# Patient Record
Sex: MALE | Race: WHITE | NOT HISPANIC OR LATINO | Employment: FULL TIME | URBAN - METROPOLITAN AREA
[De-identification: names, ages, dates, MRNs, and addresses within clinical notes are randomized per-mention and may not be internally consistent; named-entity substitution may affect disease eponyms.]

---

## 2018-07-23 ENCOUNTER — OFFICE VISIT CONVERTED (OUTPATIENT)
Dept: CARDIOLOGY | Facility: CLINIC | Age: 38
End: 2018-07-23
Attending: INTERNAL MEDICINE

## 2018-07-25 ENCOUNTER — CONVERSION ENCOUNTER (OUTPATIENT)
Dept: CARDIOLOGY | Facility: CLINIC | Age: 38
End: 2018-07-25

## 2018-07-25 ENCOUNTER — CONVERSION ENCOUNTER (OUTPATIENT)
Dept: CARDIOLOGY | Facility: CLINIC | Age: 38
End: 2018-07-25
Attending: INTERNAL MEDICINE

## 2019-06-23 ENCOUNTER — HOSPITAL ENCOUNTER (OUTPATIENT)
Dept: URGENT CARE | Facility: CLINIC | Age: 39
Discharge: HOME | End: 2019-06-23
Attending: NURSE PRACTITIONER

## 2021-05-16 VITALS
BODY MASS INDEX: 35.12 KG/M2 | HEIGHT: 73 IN | DIASTOLIC BLOOD PRESSURE: 82 MMHG | WEIGHT: 265 LBS | SYSTOLIC BLOOD PRESSURE: 143 MMHG | HEART RATE: 82 BPM

## 2021-05-16 VITALS
HEART RATE: 98 BPM | HEIGHT: 73 IN | SYSTOLIC BLOOD PRESSURE: 150 MMHG | WEIGHT: 268 LBS | DIASTOLIC BLOOD PRESSURE: 98 MMHG | BODY MASS INDEX: 35.52 KG/M2

## 2022-09-26 ENCOUNTER — TRANSCRIBE ORDERS (OUTPATIENT)
Dept: ADMINISTRATIVE | Facility: HOSPITAL | Age: 42
End: 2022-09-26

## 2022-09-26 DIAGNOSIS — R07.9 CHEST PAIN, UNSPECIFIED TYPE: Primary | ICD-10-CM

## 2022-09-26 DIAGNOSIS — Z86.79 HISTORY OF ANGINA: ICD-10-CM

## 2022-09-29 ENCOUNTER — HOSPITAL ENCOUNTER (OUTPATIENT)
Dept: CARDIOLOGY | Facility: HOSPITAL | Age: 42
Discharge: HOME | End: 2022-09-29
Admitting: FAMILY MEDICINE

## 2022-09-29 VITALS — BODY MASS INDEX: 36.84 KG/M2 | HEIGHT: 73 IN | WEIGHT: 278 LBS

## 2022-09-29 DIAGNOSIS — R07.9 CHEST PAIN, UNSPECIFIED TYPE: ICD-10-CM

## 2022-09-29 DIAGNOSIS — Z86.79 HISTORY OF ANGINA: ICD-10-CM

## 2022-09-29 LAB
BH CV IMMEDIATE POST RECOVERY TECH DATA SYMPTOMS: NORMAL
BH CV IMMEDIATE POST TECH DATA BLOOD PRESSURE: NORMAL MMHG
BH CV IMMEDIATE POST TECH DATA HEART RATE: 140 BPM
BH CV NINE MINUTE RECOVERY TECH DATA BLOOD PRESSURE: NORMAL MMHG
BH CV NINE MINUTE RECOVERY TECH DATA HEART RATE: 99 BPM
BH CV NINE MINUTE RECOVERY TECH DATA SYMPTOMS: NORMAL
BH CV SIX MINUTE RECOVERY TECH DATA BLOOD PRESSURE: NORMAL
BH CV SIX MINUTE RECOVERY TECH DATA HEART RATE: 103 BPM
BH CV SIX MINUTE RECOVERY TECH DATA SYMPTOMS: NORMAL
BH CV STRESS BP STAGE 1: NORMAL
BH CV STRESS BP STAGE 2: NORMAL
BH CV STRESS BP STAGE 3: NORMAL
BH CV STRESS DURATION MIN STAGE 1: 3
BH CV STRESS DURATION MIN STAGE 2: 3
BH CV STRESS DURATION MIN STAGE 3: 3
BH CV STRESS DURATION SEC STAGE 1: 0
BH CV STRESS DURATION SEC STAGE 2: 0
BH CV STRESS DURATION SEC STAGE 3: 0
BH CV STRESS GRADE STAGE 1: 10
BH CV STRESS GRADE STAGE 2: 12
BH CV STRESS GRADE STAGE 3: 14
BH CV STRESS HR STAGE 1: 124
BH CV STRESS HR STAGE 2: 136
BH CV STRESS HR STAGE 3: 156
BH CV STRESS METS STAGE 1: 5
BH CV STRESS METS STAGE 2: 7.5
BH CV STRESS METS STAGE 3: 10
BH CV STRESS O2 STAGE 2: 97
BH CV STRESS O2 STAGE 3: 96
BH CV STRESS PROTOCOL 1: NORMAL
BH CV STRESS RECOVERY BP: NORMAL MMHG
BH CV STRESS RECOVERY HR: 102 BPM
BH CV STRESS SPEED STAGE 1: 1.7
BH CV STRESS SPEED STAGE 2: 2.5
BH CV STRESS SPEED STAGE 3: 3.4
BH CV STRESS STAGE 1: 1
BH CV STRESS STAGE 2: 2
BH CV STRESS STAGE 3: 3
BH CV THREE MINUTE POST TECH DATA BLOOD PRESSURE: NORMAL MMHG
BH CV THREE MINUTE POST TECH DATA HEART RATE: 109 BPM
BH CV THREE MINUTE RECOVERY TECH DATA SYMPTOM: NORMAL
BH CV TWELVE MINUTE RECOVERY TECH DATA BLOOD PRESSURE: NORMAL MMHG
BH CV TWELVE MINUTE RECOVERY TECH DATA HEART RATE: 102 BPM
BH CV TWELVE MINUTE RECOVERY TECH DATA SYMPTOMS: NORMAL
MAXIMAL PREDICTED HEART RATE: 180 BPM
PERCENT MAX PREDICTED HR: 86.67 %
STRESS BASELINE BP: NORMAL MMHG
STRESS BASELINE HR: 97 BPM
STRESS PERCENT HR: 102 %
STRESS POST ESTIMATED WORKLOAD: 10.2 METS
STRESS POST EXERCISE DUR MIN: 9 MIN
STRESS POST EXERCISE DUR SEC: 0 SEC
STRESS POST O2 SAT PEAK: 97 %
STRESS POST PEAK BP: NORMAL MMHG
STRESS POST PEAK HR: 156 BPM
STRESS TARGET HR: 153 BPM

## 2022-09-29 PROCEDURE — 93018 CV STRESS TEST I&R ONLY: CPT | Performed by: INTERNAL MEDICINE

## 2022-09-29 PROCEDURE — 93017 CV STRESS TEST TRACING ONLY: CPT

## 2022-09-29 RX ORDER — METOPROLOL SUCCINATE 50 MG/1
50 TABLET, EXTENDED RELEASE ORAL DAILY
COMMUNITY
End: 2022-11-04

## 2022-09-29 RX ORDER — LISINOPRIL AND HYDROCHLOROTHIAZIDE 25; 20 MG/1; MG/1
1 TABLET ORAL DAILY
COMMUNITY
End: 2022-11-04

## 2022-09-29 NOTE — DISCHARGE INSTRUCTIONS
Ordering physicians/PCP will contact you with results.  Follow up with PCP/ordering physician as scheduled or as needed.  If chest pain gets worse, lasts longer, and/or doesn't go away by itself we recommend calling 911 or going to the nearest emergency room.  Resume activity as tolerated.  Continue prescribed medications as ordered.

## 2022-11-03 ENCOUNTER — OFFICE VISIT (OUTPATIENT)
Dept: CARDIOLOGY | Facility: CLINIC | Age: 42
End: 2022-11-03

## 2022-11-03 VITALS
HEART RATE: 69 BPM | WEIGHT: 290.4 LBS | DIASTOLIC BLOOD PRESSURE: 112 MMHG | SYSTOLIC BLOOD PRESSURE: 150 MMHG | BODY MASS INDEX: 38.49 KG/M2 | HEIGHT: 73 IN

## 2022-11-03 DIAGNOSIS — R07.89 CHEST PAIN, ATYPICAL: Primary | ICD-10-CM

## 2022-11-03 DIAGNOSIS — G47.10 HYPERSOMNIA: ICD-10-CM

## 2022-11-03 DIAGNOSIS — I10 ESSENTIAL HYPERTENSION: ICD-10-CM

## 2022-11-03 PROCEDURE — 99204 OFFICE O/P NEW MOD 45 MIN: CPT | Performed by: INTERNAL MEDICINE

## 2022-11-03 RX ORDER — NITROGLYCERIN 0.4 MG/1
TABLET SUBLINGUAL AS NEEDED
COMMUNITY
Start: 2022-09-26 | End: 2023-02-13

## 2022-11-03 NOTE — PROGRESS NOTES
[facility]      Patient Name: Mani Edgar  :1980  Age: 41 y.o.  Encounter Provider:  Leonardo Garcia Jr, MD      Chief Complaint: Initial evaluation of chest discomfort and uncontrolled hypertension      HPI  Mani Edgar is a 41 y.o. male past medical history of normal cardiac catheterization in 2018 who recently started having chest discomfort and was evaluated at [facility].  He had very typical chest pain radiating to the left upper extremity 2018 was taken for cardiac catheterization.  No intervention performed but I do not have the report for evaluation at this time.  Records have been requested.  He was noted to have hypertension at the time and was started on medications which has been pretty well controlled until the end of September.  He woke up on  very dizzy with a blood pressure of 215/135 mmHg.  Benign work-up in the ER but he followed up at the local cardiology office.  A stress study was performed showing nondiagnostic ST changes but severe hypertensive response to exercise.  His lisinopril hydrochlorothiazide combination pill was doubled but he still having very high blood pressure.  He did not bring a home log in for evaluation at time of interview.  He denies exertional dyspnea, orthopnea, PND or edema.  No palpitations, dizziness or syncope.  He admits to snoring but has never been evaluated for sleep apnea.  No history of thyroid disease.  No family history of coronary artery disease.  He is a lifelong non-smoker drinks rarely and denies illicit drug use.      The following portions of the patient's history were reviewed and updated as appropriate: allergies, current medications, past family history, past medical history, past social history, past surgical history and problem list.      Review of Systems   Constitutional: Negative for chills and fever.   HENT: Negative for hoarse voice and sore throat.    Eyes: Negative for double vision and  "photophobia.   Cardiovascular: Positive for chest pain. Negative for leg swelling, near-syncope, orthopnea, palpitations, paroxysmal nocturnal dyspnea and syncope.   Respiratory: Negative for cough and wheezing.    Skin: Negative for poor wound healing and rash.   Musculoskeletal: Negative for arthritis and joint swelling.   Gastrointestinal: Negative for bloating, abdominal pain, hematemesis and hematochezia.   Neurological: Positive for dizziness. Negative for focal weakness.   Psychiatric/Behavioral: Negative for depression and suicidal ideas.       OBJECTIVE:   Vital Signs  Vitals:    11/03/22 0949   BP: (!) 150/112   Pulse: 69     Estimated body mass index is 38.31 kg/m² as calculated from the following:    Height as of this encounter: 185.4 cm (73\").    Weight as of this encounter: 132 kg (290 lb 6.4 oz).    Vitals reviewed.   Constitutional:       Appearance: Healthy appearance. Not in distress.   Neck:      Vascular: No JVR. JVD normal.   Pulmonary:      Effort: Pulmonary effort is normal.      Breath sounds: Normal breath sounds. No wheezing. No rhonchi. No rales.   Chest:      Chest wall: Not tender to palpatation.   Cardiovascular:      PMI at left midclavicular line. Normal rate. Regular rhythm. Normal S1. Normal S2.      Murmurs: There is no murmur.      No gallop. No click. No rub.   Pulses:     Intact distal pulses.   Edema:     Peripheral edema absent.   Abdominal:      General: Bowel sounds are normal.      Palpations: Abdomen is soft.      Tenderness: There is no abdominal tenderness.   Musculoskeletal: Normal range of motion.         General: No tenderness. Skin:     General: Skin is warm and dry.   Neurological:      General: No focal deficit present.      Mental Status: Alert and oriented to person, place and time.           ECG 12 Lead    Date/Time: 11/4/2022 1:12 PM  Performed by: Leonardo Garcia Jr., MD  Authorized by: Leonardo Garcia Jr., MD   Comparison: not compared with previous ECG "   Previous ECG: no previous ECG available  Rhythm: sinus rhythm  Other findings: left atrial abnormality    Clinical impression: non-specific ECG                  ASSESSMENT:     41-year-old male presents for evaluation management of uncontrolled hypertension    PLAN OF CARE:     1. Uncontrolled hypertension -poorly controlled and needs aggressive afterload reduction.  We will stop metoprolol and start carvedilol hoping that alpha antagonism will improve blood pressure control.  I will increase the lisinopril portion of his combination pill and continue the current hydrochlorothiazide dosing so that he will have a 40/25 mg pill daily.  He will send me a twice daily blood pressure log after 2 weeks.  We will reevaluate him in clinic in 1 month.  At that point we will decide whether or not he it is appropriate for him to return to work but with his current level of blood pressure control I cannot recommend that he go back to work.  Given his hypersomnia and snoring we will plan for home sleep study.  He will continue to work on slowly increasing exercise and decreasing caloric intake.  2. Hypersomnia -Home sleep study  3. Abnormal stress test -secondary to hypertensive response.  Normal cath 5 years ago.  Low suspicion for ischemic etiology.    Return to clinic 4 weeks             Discharge Medications          Accurate as of November 3, 2022  9:54 AM. If you have any questions, ask your nurse or doctor.            Continue These Medications      Instructions Start Date   lisinopril-hydrochlorothiazide 20-25 MG per tablet  Commonly known as: PRINZIDE,ZESTORETIC   1 tablet, Oral, Daily      metoprolol succinate XL 50 MG 24 hr tablet  Commonly known as: TOPROL-XL   50 mg, Oral, Daily      nitroglycerin 0.4 MG SL tablet  Commonly known as: NITROSTAT   As Needed             Thank you for allowing me to participate in the care of your patient,      Sincerely,   Leonardo Garcia MD  [facility]  11/03/22  09:54 EDT

## 2022-11-04 PROCEDURE — 93000 ELECTROCARDIOGRAM COMPLETE: CPT | Performed by: INTERNAL MEDICINE

## 2022-11-04 RX ORDER — CARVEDILOL 12.5 MG/1
12.5 TABLET ORAL 2 TIMES DAILY
Qty: 60 TABLET | Refills: 11 | Status: SHIPPED | OUTPATIENT
Start: 2022-11-04 | End: 2023-01-11

## 2022-11-04 RX ORDER — LISINOPRIL 40 MG/1
40 TABLET ORAL DAILY
Qty: 30 TABLET | Refills: 11 | Status: SHIPPED | OUTPATIENT
Start: 2022-11-04

## 2022-11-04 RX ORDER — HYDROCHLOROTHIAZIDE 25 MG/1
25 TABLET ORAL DAILY
Qty: 30 TABLET | Refills: 11 | Status: SHIPPED | OUTPATIENT
Start: 2022-11-04 | End: 2023-01-11 | Stop reason: ALTCHOICE

## 2022-11-11 ENCOUNTER — HOSPITAL ENCOUNTER (OUTPATIENT)
Dept: SLEEP MEDICINE | Facility: HOSPITAL | Age: 42
Discharge: HOME | End: 2022-11-11
Admitting: INTERNAL MEDICINE

## 2022-11-11 DIAGNOSIS — G47.10 HYPERSOMNIA: ICD-10-CM

## 2022-11-11 PROCEDURE — 95806 SLEEP STUDY UNATT&RESP EFFT: CPT

## 2022-11-11 PROCEDURE — 95806 SLEEP STUDY UNATT&RESP EFFT: CPT | Performed by: INTERNAL MEDICINE

## 2022-11-17 DIAGNOSIS — R06.83 SNORING: ICD-10-CM

## 2022-11-17 DIAGNOSIS — G47.33 OSA (OBSTRUCTIVE SLEEP APNEA): Primary | ICD-10-CM

## 2022-12-02 ENCOUNTER — TELEPHONE (OUTPATIENT)
Dept: CARDIOLOGY | Facility: CLINIC | Age: 42
End: 2022-12-02

## 2022-12-02 ENCOUNTER — TELEPHONE (OUTPATIENT)
Dept: SLEEP MEDICINE | Facility: HOSPITAL | Age: 42
End: 2022-12-02

## 2022-12-02 ENCOUNTER — LAB (OUTPATIENT)
Dept: LAB | Facility: HOSPITAL | Age: 42
End: 2022-12-02

## 2022-12-02 ENCOUNTER — OFFICE VISIT (OUTPATIENT)
Dept: CARDIOLOGY | Facility: CLINIC | Age: 42
End: 2022-12-02

## 2022-12-02 VITALS
HEIGHT: 73 IN | BODY MASS INDEX: 38.62 KG/M2 | HEART RATE: 73 BPM | WEIGHT: 291.4 LBS | OXYGEN SATURATION: 98 % | SYSTOLIC BLOOD PRESSURE: 148 MMHG | DIASTOLIC BLOOD PRESSURE: 100 MMHG

## 2022-12-02 DIAGNOSIS — I10 ESSENTIAL HYPERTENSION: Primary | ICD-10-CM

## 2022-12-02 DIAGNOSIS — I10 ESSENTIAL HYPERTENSION: ICD-10-CM

## 2022-12-02 LAB
ANION GAP SERPL CALCULATED.3IONS-SCNC: 8.8 MMOL/L (ref 5–15)
BUN SERPL-MCNC: 11 MG/DL (ref 6–20)
BUN/CREAT SERPL: 9.7 (ref 7–25)
CALCIUM SPEC-SCNC: 9.5 MG/DL (ref 8.6–10.5)
CHLORIDE SERPL-SCNC: 103 MMOL/L (ref 98–107)
CO2 SERPL-SCNC: 28.2 MMOL/L (ref 22–29)
CREAT SERPL-MCNC: 1.13 MG/DL (ref 0.76–1.27)
EGFRCR SERPLBLD CKD-EPI 2021: 84.3 ML/MIN/1.73
GLUCOSE SERPL-MCNC: 102 MG/DL (ref 65–99)
POTASSIUM SERPL-SCNC: 4.5 MMOL/L (ref 3.5–5.2)
SODIUM SERPL-SCNC: 140 MMOL/L (ref 136–145)

## 2022-12-02 PROCEDURE — 36415 COLL VENOUS BLD VENIPUNCTURE: CPT

## 2022-12-02 PROCEDURE — 80048 BASIC METABOLIC PNL TOTAL CA: CPT

## 2022-12-02 PROCEDURE — 99214 OFFICE O/P EST MOD 30 MIN: CPT | Performed by: INTERNAL MEDICINE

## 2022-12-02 RX ORDER — SPIRONOLACTONE 25 MG/1
25 TABLET ORAL DAILY
Qty: 30 TABLET | Refills: 11 | Status: SHIPPED | OUTPATIENT
Start: 2022-12-02 | End: 2023-03-20 | Stop reason: SDUPTHER

## 2022-12-02 NOTE — TELEPHONE ENCOUNTER
Left message that labs were within normal range including electrolytes.  We will add spironolactone 25 mg daily.  Twice daily blood pressure log.   Yes

## 2022-12-02 NOTE — PROGRESS NOTES
[facility]      Patient Name: Mani Edgar  :1980  Age: 41 y.o.  Encounter Provider:  Leonardo Garcia Jr, MD      Chief Complaint: Initial evaluation of chest discomfort and uncontrolled hypertension      HPI  Mani Edgar is a 41 y.o. male past medical history of normal cardiac catheterization in 2018 who recently started having chest discomfort and was evaluated at [facility].      Last clinic visit note: He had very typical chest pain radiating to the left upper extremity 2018 was taken for cardiac catheterization.  No intervention performed but I do not have the report for evaluation at this time.  Records have been requested.  He was noted to have hypertension at the time and was started on medications which has been pretty well controlled until the end of September.  He woke up on  very dizzy with a blood pressure of 215/135 mmHg.  Benign work-up in the ER but he followed up at the local cardiology office.  A stress study was performed showing nondiagnostic ST changes but severe hypertensive response to exercise.  His lisinopril hydrochlorothiazide combination pill was doubled but he still having very high blood pressure.  He did not bring a home log in for evaluation at time of interview.  He denies exertional dyspnea, orthopnea, PND or edema.  No palpitations, dizziness or syncope.  He admits to snoring but has never been evaluated for sleep apnea.  No history of thyroid disease.  No family history of coronary artery disease.  He is a lifelong non-smoker drinks rarely and denies illicit drug use.    Suboptimal blood pressure control.  He still having occasional chest discomfort and shortness of air with exertion.  Blood pressure is better but not quite where we need to be.  He did have a positive sleep study and is waiting on evaluation for CPAP.  He is noting some increased cramping and also increased urine output with the diuretic dosing.  No orthopnea, PND  "or edema.  No palpitations, dizziness or syncope.    The following portions of the patient's history were reviewed and updated as appropriate: allergies, current medications, past family history, past medical history, past social history, past surgical history and problem list.      Review of Systems   Constitutional: Negative for chills and fever.   HENT: Negative for hoarse voice and sore throat.    Eyes: Negative for double vision and photophobia.   Cardiovascular: Positive for chest pain. Negative for leg swelling, near-syncope, orthopnea, palpitations, paroxysmal nocturnal dyspnea and syncope.   Respiratory: Negative for cough and wheezing.    Skin: Negative for poor wound healing and rash.   Musculoskeletal: Negative for arthritis and joint swelling.   Gastrointestinal: Negative for bloating, abdominal pain, hematemesis and hematochezia.   Neurological: Positive for dizziness. Negative for focal weakness.   Psychiatric/Behavioral: Negative for depression and suicidal ideas.       OBJECTIVE:   Vital Signs  Vitals:    12/02/22 1005   BP: 148/100   Pulse: 73   SpO2: 98%     Estimated body mass index is 38.45 kg/m² as calculated from the following:    Height as of this encounter: 185.4 cm (73\").    Weight as of this encounter: 132 kg (291 lb 6.4 oz).    Vitals reviewed.   Constitutional:       Appearance: Healthy appearance. Not in distress.   Neck:      Vascular: No JVR. JVD normal.   Pulmonary:      Effort: Pulmonary effort is normal.      Breath sounds: Normal breath sounds. No wheezing. No rhonchi. No rales.   Chest:      Chest wall: Not tender to palpatation.   Cardiovascular:      PMI at left midclavicular line. Normal rate. Regular rhythm. Normal S1. Normal S2.      Murmurs: There is no murmur.      No gallop. No click. No rub.   Pulses:     Intact distal pulses.   Edema:     Peripheral edema absent.   Abdominal:      General: Bowel sounds are normal.      Palpations: Abdomen is soft.      Tenderness: There " is no abdominal tenderness.   Musculoskeletal: Normal range of motion.         General: No tenderness. Skin:     General: Skin is warm and dry.   Neurological:      General: No focal deficit present.      Mental Status: Alert and oriented to person, place and time.         Procedures          ASSESSMENT:     41-year-old male presents for evaluation management of uncontrolled hypertension    PLAN OF CARE:     1. Uncontrolled hypertension -labs today showed normal electrolyte values with normal serum creatinine.  We will add Aldactone.  I will see him back in 1 month to reevaluate prior to endorsing return to work.  Check renal artery Doppler at [facility].  2. Hypersomnia -Home sleep study positive.  Await CPAP.  3. Abnormal stress test -secondary to hypertensive response.  Normal cath 5 years ago.  Low suspicion for ischemic etiology.    Return to clinic 4 weeks             Discharge Medications          Accurate as of December 2, 2022 10:07 AM. If you have any questions, ask your nurse or doctor.            Continue These Medications      Instructions Start Date   carvedilol 12.5 MG tablet  Commonly known as: COREG   12.5 mg, Oral, 2 Times Daily      hydroCHLOROthiazide 25 MG tablet  Commonly known as: HYDRODIURIL   25 mg, Oral, Daily      lisinopril 40 MG tablet  Commonly known as: PRINIVIL,ZESTRIL   40 mg, Oral, Daily      nitroglycerin 0.4 MG SL tablet  Commonly known as: NITROSTAT   As Needed             Thank you for allowing me to participate in the care of your patient,      Sincerely,   Leonardo Garcia MD  [facility]  12/02/22  10:07 EST

## 2022-12-02 NOTE — TELEPHONE ENCOUNTER
Spoke with patient about sleep study results and drs recommendations for CPAP he would like to think about it and discuss further with cardiology before proceeding. Will call once he has made decision

## 2022-12-30 ENCOUNTER — APPOINTMENT (OUTPATIENT)
Dept: CARDIOLOGY | Facility: HOSPITAL | Age: 42
End: 2022-12-30

## 2023-01-11 ENCOUNTER — OFFICE VISIT (OUTPATIENT)
Dept: CARDIOLOGY | Facility: CLINIC | Age: 43
End: 2023-01-11
Payer: COMMERCIAL

## 2023-01-11 ENCOUNTER — LAB (OUTPATIENT)
Dept: LAB | Facility: HOSPITAL | Age: 43
End: 2023-01-11
Payer: COMMERCIAL

## 2023-01-11 ENCOUNTER — TELEPHONE (OUTPATIENT)
Dept: SLEEP MEDICINE | Facility: HOSPITAL | Age: 43
End: 2023-01-11
Payer: COMMERCIAL

## 2023-01-11 VITALS
WEIGHT: 293.6 LBS | BODY MASS INDEX: 38.91 KG/M2 | HEART RATE: 75 BPM | HEIGHT: 73 IN | SYSTOLIC BLOOD PRESSURE: 140 MMHG | DIASTOLIC BLOOD PRESSURE: 100 MMHG | OXYGEN SATURATION: 99 %

## 2023-01-11 DIAGNOSIS — G47.33 OSA (OBSTRUCTIVE SLEEP APNEA): Primary | ICD-10-CM

## 2023-01-11 DIAGNOSIS — R07.89 CHEST PAIN, ATYPICAL: ICD-10-CM

## 2023-01-11 DIAGNOSIS — I10 ESSENTIAL HYPERTENSION: ICD-10-CM

## 2023-01-11 LAB
ANION GAP SERPL CALCULATED.3IONS-SCNC: 12.3 MMOL/L (ref 5–15)
BUN SERPL-MCNC: 14 MG/DL (ref 6–20)
BUN/CREAT SERPL: 14.7 (ref 7–25)
CALCIUM SPEC-SCNC: 9.3 MG/DL (ref 8.6–10.5)
CHLORIDE SERPL-SCNC: 101 MMOL/L (ref 98–107)
CO2 SERPL-SCNC: 19.7 MMOL/L (ref 22–29)
CREAT SERPL-MCNC: 0.95 MG/DL (ref 0.76–1.27)
EGFRCR SERPLBLD CKD-EPI 2021: 103.1 ML/MIN/1.73
GLUCOSE SERPL-MCNC: 82 MG/DL (ref 65–99)
POTASSIUM SERPL-SCNC: 4.9 MMOL/L (ref 3.5–5.2)
SODIUM SERPL-SCNC: 133 MMOL/L (ref 136–145)

## 2023-01-11 PROCEDURE — 36415 COLL VENOUS BLD VENIPUNCTURE: CPT

## 2023-01-11 PROCEDURE — 80048 BASIC METABOLIC PNL TOTAL CA: CPT

## 2023-01-11 PROCEDURE — 99214 OFFICE O/P EST MOD 30 MIN: CPT | Performed by: INTERNAL MEDICINE

## 2023-01-11 RX ORDER — AZELASTINE 1 MG/ML
SPRAY, METERED NASAL AS NEEDED
COMMUNITY
Start: 2022-12-20 | End: 2023-03-20 | Stop reason: ALTCHOICE

## 2023-01-11 RX ORDER — CARVEDILOL 25 MG/1
25 TABLET ORAL 2 TIMES DAILY
Qty: 180 TABLET | Refills: 3 | Status: SHIPPED | OUTPATIENT
Start: 2023-01-11

## 2023-01-11 NOTE — TELEPHONE ENCOUNTER
Lm on pts vm for pt to give us a call in regards to getting set up on cpap and to schedule a f/u appt if need be.

## 2023-01-11 NOTE — PROGRESS NOTES
[facility]      Patient Name: Mani Edgar  :1980  Age: 42 y.o.  Encounter Provider:  Leonardo Garcia Jr, MD      Chief Complaint: Initial evaluation of chest discomfort and uncontrolled hypertension      HPI  Mani Edgar is a 42 y.o. male past medical history of normal cardiac catheterization in 2018 who recently started having chest discomfort and was evaluated at [facility].      Last clinic visit note: He had very typical chest pain radiating to the left upper extremity 2018 was taken for cardiac catheterization.  No intervention performed but I do not have the report for evaluation at this time.  Records have been requested.  He was noted to have hypertension at the time and was started on medications which has been pretty well controlled until the end of September.  He woke up on  very dizzy with a blood pressure of 215/135 mmHg.  Benign work-up in the ER but he followed up at the local cardiology office.  A stress study was performed showing nondiagnostic ST changes but severe hypertensive response to exercise.  His lisinopril hydrochlorothiazide combination pill was doubled but he still having very high blood pressure.  He did not bring a home log in for evaluation at time of interview.  He denies exertional dyspnea, orthopnea, PND or edema.  No palpitations, dizziness or syncope.  He admits to snoring but has never been evaluated for sleep apnea.  No history of thyroid disease.  No family history of coronary artery disease.  He is a lifelong non-smoker drinks rarely and denies illicit drug use.    Suboptimal blood pressure control.  He still having chest pain with minimal activity.  Renal artery duplex is scheduled tomorrow at [facility].  He is tolerating all current medications well.  Labs are due today.  He is restricting dietary sodium.  Social and family history was reviewed and is not pertinent to this clinic visit    The following portions of the  "patient's history were reviewed and updated as appropriate: allergies, current medications, past family history, past medical history, past social history, past surgical history and problem list.      Review of Systems   Constitutional: Negative for chills and fever.   HENT: Negative for hoarse voice and sore throat.    Eyes: Negative for double vision and photophobia.   Cardiovascular: Positive for chest pain. Negative for leg swelling, near-syncope, orthopnea, palpitations, paroxysmal nocturnal dyspnea and syncope.   Respiratory: Negative for cough and wheezing.    Skin: Negative for poor wound healing and rash.   Musculoskeletal: Negative for arthritis and joint swelling.   Gastrointestinal: Negative for bloating, abdominal pain, hematemesis and hematochezia.   Neurological: Positive for dizziness. Negative for focal weakness.   Psychiatric/Behavioral: Negative for depression and suicidal ideas.       OBJECTIVE:   Vital Signs  Vitals:    01/11/23 1031   BP: 140/100   Pulse: 75   SpO2: 99%     Estimated body mass index is 38.74 kg/m² as calculated from the following:    Height as of this encounter: 185.4 cm (73\").    Weight as of this encounter: 133 kg (293 lb 9.6 oz).    Vitals reviewed.   Constitutional:       Appearance: Healthy appearance. Not in distress.   Neck:      Vascular: No JVR. JVD normal.   Pulmonary:      Effort: Pulmonary effort is normal.      Breath sounds: Normal breath sounds. No wheezing. No rhonchi. No rales.   Chest:      Chest wall: Not tender to palpatation.   Cardiovascular:      PMI at left midclavicular line. Normal rate. Regular rhythm. Normal S1. Normal S2.      Murmurs: There is no murmur.      No gallop. No click. No rub.   Pulses:     Intact distal pulses.   Edema:     Peripheral edema absent.   Abdominal:      General: Bowel sounds are normal.      Palpations: Abdomen is soft.      Tenderness: There is no abdominal tenderness.   Musculoskeletal: Normal range of motion.         " General: No tenderness. Skin:     General: Skin is warm and dry.   Neurological:      General: No focal deficit present.      Mental Status: Alert and oriented to person, place and time.         Procedures          ASSESSMENT:     41-year-old male presents for evaluation management of uncontrolled hypertension    PLAN OF CARE:     1. Uncontrolled hypertension -repeat labs today.  Renal artery Doppler to be performed tomorrow.  Increase Coreg.  If potassium and serum creatinine will allow we will also consider increasing Aldactone.  2. Hypersomnia -Home sleep study positive.  He still has not heard about CPAP machine and was also told by whoever called him on the phone that he does not really need a CPAP?  I will refer him to Dr. Choi for evaluation in clinic.  3. Abnormal stress test -unfortunately he continues to have chest pain.  We will plan for CT coronary angiogram.    He will need to remain off from work as he cannot perform his duties given current symptoms.  Return to clinic 4 weeks where we will reevaluate appropriateness to return to vocational duties.             Discharge Medications          Accurate as of January 11, 2023 10:34 AM. If you have any questions, ask your nurse or doctor.            Continue These Medications      Instructions Start Date   azelastine 0.1 % nasal spray  Commonly known as: ASTELIN   As Needed      carvedilol 12.5 MG tablet  Commonly known as: COREG   12.5 mg, Oral, 2 Times Daily      hydroCHLOROthiazide 25 MG tablet  Commonly known as: HYDRODIURIL   25 mg, Oral, Daily      lisinopril 40 MG tablet  Commonly known as: PRINIVIL,ZESTRIL   40 mg, Oral, Daily      nitroglycerin 0.4 MG SL tablet  Commonly known as: NITROSTAT   As Needed      spironolactone 25 MG tablet  Commonly known as: ALDACTONE   25 mg, Oral, Daily             Thank you for allowing me to participate in the care of your patient,      Sincerely,   Leonardo Garcia MD  [facility]  01/11/23  10:34 EST

## 2023-01-12 ENCOUNTER — HOSPITAL ENCOUNTER (OUTPATIENT)
Dept: CARDIOLOGY | Facility: HOSPITAL | Age: 43
Discharge: HOME | End: 2023-01-12
Admitting: INTERNAL MEDICINE
Payer: COMMERCIAL

## 2023-01-12 ENCOUNTER — TELEPHONE (OUTPATIENT)
Dept: SLEEP MEDICINE | Facility: HOSPITAL | Age: 43
End: 2023-01-12
Payer: COMMERCIAL

## 2023-01-12 ENCOUNTER — TELEPHONE (OUTPATIENT)
Dept: CARDIOLOGY | Facility: CLINIC | Age: 43
End: 2023-01-12
Payer: COMMERCIAL

## 2023-01-12 DIAGNOSIS — I10 ESSENTIAL HYPERTENSION: ICD-10-CM

## 2023-01-12 LAB
BH CV ECHO MEAS - DIST REN A EDV LEFT: 36 CM/S
BH CV ECHO MEAS - DIST REN A PSV LEFT: 102 CM/S
BH CV ECHO MEAS - MID REN A EDV LEFT: 24 CM/S
BH CV ECHO MEAS - MID REN A PSV LEFT: 94 CM/S
BH CV ECHO MEAS - PROX REN A EDV LEFT: 37 CM/S
BH CV ECHO MEAS - PROX REN A PSV LEFT: 95 CM/S
BH CV VAS BP LEFT ARM: NORMAL MMHG
BH CV VAS BP RIGHT ARM: NORMAL MMHG
BH CV VAS KIDNEY HEIGHT LEFT: 7.1 CM
BH CV VAS RENAL AORTIC MID EDV: 4 CM/S
BH CV VAS RENAL AORTIC MID PSV: 47 CM/S
BH CV VAS RENAL HILUM LEFT EDV: 8 CM/S
BH CV VAS RENAL HILUM LEFT PSV: 24 CM/S
BH CV VAS RENAL HILUM RIGHT EDV: 9 CM/S
BH CV VAS RENAL HILUM RIGHT PSV: 25 CM/S
BH CV XLRA MEAS - KID L LEFT: 12.9 CM
BH CV XLRA MEAS DIST REN A EDV RIGHT: 20 CM/S
BH CV XLRA MEAS DIST REN A PSV RIGHT: 72 CM/S
BH CV XLRA MEAS KID H RIGHT: 5.2 CM
BH CV XLRA MEAS KID L RIGHT: 12.7 CM
BH CV XLRA MEAS KID W RIGHT: 5.7 CM
BH CV XLRA MEAS MID REN A EDV RIGHT: 38 CM/S
BH CV XLRA MEAS MID REN A PSV RIGHT: 90 CM/S
BH CV XLRA MEAS PROX REN A EDV RIGHT: 36 CM/S
BH CV XLRA MEAS PROX REN A PSV RIGHT: 96 CM/S
BH CV XLRA MEAS RENAL A ORG EDV LEFT: 25 CM/S
BH CV XLRA MEAS RENAL A ORG EDV RIGHT: 32 CM/S
BH CV XLRA MEAS RENAL A ORG PSV LEFT: 101 CM/S
BH CV XLRA MEAS RENAL A ORG PSV RIGHT: 85 CM/S
LEFT KIDNEY WIDTH: 5.1 CM
LEFT RENAL UPPER PARENCHYMA MAX: 16 CM/S
LEFT RENAL UPPER PARENCHYMA MIN: 7 CM/S
LEFT RENAL UPPER PARENCHYMA RI: 0.56
MAXIMAL PREDICTED HEART RATE: 179 BPM
RIGHT RENAL UPPER PARENCHYMA MAX: 16 CM/S
RIGHT RENAL UPPER PARENCHYMA MIN: 6 CM/S
RIGHT RENAL UPPER PARENCHYMA RI: 0.63
STRESS TARGET HR: 152 BPM

## 2023-01-12 PROCEDURE — 93975 VASCULAR STUDY: CPT

## 2023-01-12 PROCEDURE — 93975 VASCULAR STUDY: CPT | Performed by: SURGERY

## 2023-01-12 NOTE — TELEPHONE ENCOUNTER
Left voicemail for Mani Edgar requesting callback.    Thank you,  Catherine Valencia RN  Triage Nurse G

## 2023-01-12 NOTE — TELEPHONE ENCOUNTER
Please inform patient renal function is overall stable. At this time, he needs to increase carvedilol as covered by Dr. Garcia during their office visit yesterday. He is having further testing today and sees sleep medicine tomorrow. I am not going to increase spironolactone just yet but we will plan to keep appt with me in 1 month and will reassess.please request that he bring some home BP and pulse values from home to our one month follow up

## 2023-01-12 NOTE — TELEPHONE ENCOUNTER
Notified patient of results/recommendations. Patient verbalized understanding.    Sarai Cunningham RN  Triage [facility]

## 2023-01-12 NOTE — TELEPHONE ENCOUNTER
Spoke with pt. Pt would like to move forward with treatment. Order faxed to Berto Jensen for set up and then f/u with Jimbo for compliance. Tech asked him to contact me in a few days if he has not heard from [employer] so tech can reach out.

## 2023-01-12 NOTE — TELEPHONE ENCOUNTER
Catherine- please inform patient renal duplex shows normal blood flow to both kidney's. I understand sleep appt will be re-scheduled.    DC instructions provided and reviewed with patient and daughter by MICHELLE Hutchison. Patient in udnerstanding of all dc instructions. No further questions for the RN regarding DC. VSS. Ambulatory./DC instructions

## 2023-01-12 NOTE — TELEPHONE ENCOUNTER
Mani Edgar returned call.  Reviewed results and recommendations with patient and he verbalized understanding.    Patient stated his appointment with sleep medicine is being rescheduled as he cannot make the appointment tomorrow.      Requested patient bring BP/HR log to appointment with Carlotta and he stated he will do this.    Thank you,  Catherine Valencia RN  Triage Nurse [facility]

## 2023-01-12 NOTE — TELEPHONE ENCOUNTER
Called and left VM. Will continue to try to reach patient.     Sarai Cunningham RN  Triage [facility] [place] [place]

## 2023-02-13 ENCOUNTER — OFFICE VISIT (OUTPATIENT)
Dept: CARDIOLOGY | Facility: CLINIC | Age: 43
End: 2023-02-13
Payer: COMMERCIAL

## 2023-02-13 VITALS
HEIGHT: 72 IN | SYSTOLIC BLOOD PRESSURE: 140 MMHG | HEART RATE: 77 BPM | DIASTOLIC BLOOD PRESSURE: 82 MMHG | BODY MASS INDEX: 40.23 KG/M2 | WEIGHT: 297 LBS

## 2023-02-13 DIAGNOSIS — I10 ESSENTIAL HYPERTENSION: Primary | ICD-10-CM

## 2023-02-13 DIAGNOSIS — Z51.81 ENCOUNTER FOR THERAPEUTIC DRUG LEVEL MONITORING: ICD-10-CM

## 2023-02-13 PROCEDURE — 99214 OFFICE O/P EST MOD 30 MIN: CPT | Performed by: NURSE PRACTITIONER

## 2023-02-13 NOTE — PROGRESS NOTES
"Date of Office Visit: 23  Encounter Provider: BELKIS Stuart  Place of Service: [facility] CARDIOLOGY  Patient Name: Mani Edgar  :1980    No chief complaint on file.  :     HPI: Mani Edgar is a 42 y.o. male  with hypertension, obstructive sleep apnea, and obesity.      He is followed by Dr. Leonardo Garcia. I will visit with him for the first time and have reviewed his medical record.     He had a clinically positive treadmill stress test 2022 with horizontal ST segment depression of 2 mm involving the inferior leads and hypertensive response.  His blood pressure was treated aggressively and he was started on carvedilol, lisinopril and spironolactone.  He had normal bilateral renal artery duplex 2023.  He presents today for reassessment.  He checks his blood pressure sporadically at home and his values anywhere from 147/93 up to 165/114.  He has been using CPAP for the past week and that is new for him.  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  He continues to complain of occasional chest tightness that is not new.  He has occasional shortness of breath with carrying a load or walking briskly.  He walks up to 30 minutes at a nice pace in his neighborhood and typically feels good with that.  When his blood pressure is high he has headache and lightheadedness.  He has no palpitations, near-syncope or syncope.  No Known Allergies        Family and social history reviewed.     ROS  All other systems were reviewed and are negative          Objective:     Vitals:    23 1517 23 1559   BP: 140/82    BP Location: Left arm    Patient Position: Sitting    Pulse: 77    Weight: 135 kg (297 lb)    Height: 73 cm (28.74\") 182.9 cm (72\")     Body mass index is 40.28 kg/m².    PHYSICAL EXAM:  Pulmonary:      Effort: Pulmonary effort is normal.      Breath sounds: Normal breath sounds.   Cardiovascular:      Normal rate. " Regular rhythm.         Procedures      Current Outpatient Medications   Medication Sig Dispense Refill   • azelastine (ASTELIN) 0.1 % nasal spray As Needed.     • carvedilol (COREG) 25 MG tablet Take 1 tablet by mouth 2 (Two) Times a Day. 180 tablet 3   • lisinopril (PRINIVIL,ZESTRIL) 40 MG tablet Take 1 tablet by mouth Daily. 30 tablet 11   • spironolactone (ALDACTONE) 25 MG tablet Take 1 tablet by mouth Daily. 30 tablet 11     No current facility-administered medications for this visit.     Assessment:       Diagnosis Plan   1. Essential hypertension  Basic Metabolic Panel      2. Encounter for therapeutic drug level monitoring  Basic Metabolic Panel           Orders Placed This Encounter   Procedures   • Basic Metabolic Panel     Standing Status:   Future     Standing Expiration Date:   2/13/2024     Order Specific Question:   Release to patient     Answer:   Routine Release         Plan:       1.  42-year-old gentleman with hypertension.  Renal artery duplex was normal bilateral June 2023.  His blood pressure remains above goal despite carvedilol 25 mg twice daily, lisinopril 40 mg daily and spironolactone 25 mg daily.  I will increase spinal lactone to 50 mg daily and arrange for BMP at [facility] which is closer to his house in 1 to 2 weeks.  He will follow-up in clinic in 4 to 6 weeks.  I have asked him to keep a home blood pressure log and bring me his home readings when he returns.  I filled out his [employer] paperwork to keep him off work until his next visit.  2.  Chest tightness.  Unchanged.  CT angiogram coronary scheduled 3/17/2023.  3.  Dyspnea-this is intermittent and we will also be able to evaluate his lungs on upcoming CT.  His lung exam was unremarkable today  4.  Obstructive sleep apnea.  Now on CPAP.  Encouraged that he continue efforts to treat this aggressively  5.  Obesity.  BMI greater than 35.0.  He would benefit from diet modification and weight loss in addition to his  exercise habits       his next appointment is March 20, 2023 with me.            It has been a pleasure to participate in this patient's care.      Thank you,  BELKIS Stuart      **I used Dragon to dictate this note:**

## 2023-03-02 ENCOUNTER — LAB (OUTPATIENT)
Dept: LAB | Facility: HOSPITAL | Age: 43
End: 2023-03-02
Payer: COMMERCIAL

## 2023-03-02 DIAGNOSIS — I10 ESSENTIAL HYPERTENSION: ICD-10-CM

## 2023-03-02 DIAGNOSIS — Z51.81 ENCOUNTER FOR THERAPEUTIC DRUG LEVEL MONITORING: ICD-10-CM

## 2023-03-02 LAB
ANION GAP SERPL CALCULATED.3IONS-SCNC: 5.9 MMOL/L (ref 5–15)
BUN SERPL-MCNC: 11 MG/DL (ref 6–20)
BUN/CREAT SERPL: 9.9 (ref 7–25)
CALCIUM SPEC-SCNC: 9.4 MG/DL (ref 8.6–10.5)
CHLORIDE SERPL-SCNC: 102 MMOL/L (ref 98–107)
CO2 SERPL-SCNC: 30.1 MMOL/L (ref 22–29)
CREAT SERPL-MCNC: 1.11 MG/DL (ref 0.76–1.27)
EGFRCR SERPLBLD CKD-EPI 2021: 85.6 ML/MIN/1.73
GLUCOSE SERPL-MCNC: 115 MG/DL (ref 65–99)
POTASSIUM SERPL-SCNC: 3.9 MMOL/L (ref 3.5–5.2)
SODIUM SERPL-SCNC: 138 MMOL/L (ref 136–145)

## 2023-03-02 PROCEDURE — 36415 COLL VENOUS BLD VENIPUNCTURE: CPT

## 2023-03-02 PROCEDURE — 80048 BASIC METABOLIC PNL TOTAL CA: CPT

## 2023-03-03 ENCOUNTER — TELEPHONE (OUTPATIENT)
Dept: CARDIOLOGY | Facility: CLINIC | Age: 43
End: 2023-03-03
Payer: COMMERCIAL

## 2023-03-03 NOTE — TELEPHONE ENCOUNTER
Please inform patient I reviewed his [facility] lab since increased spironolactone and renal function is stable. Ok to continue and needs to keep his CT appt and follow up with me this month.

## 2023-03-03 NOTE — TELEPHONE ENCOUNTER
Left VM of results/recommendations and to call back with any further questions or concerns, allowed by verbal release form.     Sarai Cunningham RN  Triage MG

## 2023-03-17 ENCOUNTER — DOCUMENTATION (OUTPATIENT)
Dept: CARDIOLOGY | Facility: CLINIC | Age: 43
End: 2023-03-17

## 2023-03-17 ENCOUNTER — HOSPITAL ENCOUNTER (OUTPATIENT)
Dept: CT IMAGING | Facility: HOSPITAL | Age: 43
Discharge: HOME | End: 2023-03-17
Admitting: INTERNAL MEDICINE
Payer: COMMERCIAL

## 2023-03-17 VITALS
DIASTOLIC BLOOD PRESSURE: 79 MMHG | RESPIRATION RATE: 18 BRPM | HEART RATE: 70 BPM | TEMPERATURE: 97.3 F | SYSTOLIC BLOOD PRESSURE: 117 MMHG | HEIGHT: 73 IN | OXYGEN SATURATION: 97 % | BODY MASS INDEX: 38.83 KG/M2 | WEIGHT: 293 LBS

## 2023-03-17 DIAGNOSIS — R07.89 CHEST PAIN, ATYPICAL: ICD-10-CM

## 2023-03-17 LAB — QT INTERVAL: 375 MS

## 2023-03-17 PROCEDURE — 93010 ELECTROCARDIOGRAM REPORT: CPT | Performed by: INTERNAL MEDICINE

## 2023-03-17 PROCEDURE — 93005 ELECTROCARDIOGRAM TRACING: CPT | Performed by: INTERNAL MEDICINE

## 2023-03-17 PROCEDURE — 75574 CT ANGIO HRT W/3D IMAGE: CPT | Performed by: STUDENT IN AN ORGANIZED HEALTH CARE EDUCATION/TRAINING PROGRAM

## 2023-03-17 PROCEDURE — 75574 CT ANGIO HRT W/3D IMAGE: CPT

## 2023-03-17 PROCEDURE — 25510000001 IOPAMIDOL PER 1 ML: Performed by: INTERNAL MEDICINE

## 2023-03-17 RX ORDER — METOPROLOL TARTRATE 5 MG/5ML
5 INJECTION INTRAVENOUS
Status: DISCONTINUED | OUTPATIENT
Start: 2023-03-17 | End: 2023-03-18 | Stop reason: HOSPADM

## 2023-03-17 RX ORDER — NITROGLYCERIN 0.4 MG/1
0.8 TABLET SUBLINGUAL ONCE
Status: COMPLETED | OUTPATIENT
Start: 2023-03-17 | End: 2023-03-17

## 2023-03-17 RX ADMIN — METOPROLOL TARTRATE 5 MG: 1 INJECTION, SOLUTION INTRAVENOUS at 11:01

## 2023-03-17 RX ADMIN — IOPAMIDOL 100 ML: 755 INJECTION, SOLUTION INTRAVENOUS at 11:25

## 2023-03-17 RX ADMIN — NITROGLYCERIN 0.8 MG: 0.4 TABLET SUBLINGUAL at 11:22

## 2023-03-17 RX ADMIN — METOPROLOL TARTRATE 5 MG: 1 INJECTION, SOLUTION INTRAVENOUS at 10:32

## 2023-03-17 NOTE — NURSING NOTE
Patient denies any pain or dizziness. IV DC and patient given Starry to drink. Patient dressed then left ambulatory from Radiology triage.

## 2023-03-17 NOTE — NURSING NOTE
Call to Shaneka Gonzales  at [facility]. Reported heart rate of  71, bp 143/94  On Mani Herve  . Order received for iv metoprolol and nitro SL. Scan when hr < 65    For systolic above 110 give 0.8 mg nitr SL  If systolic 100 give 0.4 mg  Give none if systolic 99 or less.

## 2023-03-17 NOTE — PROGRESS NOTES
Cardiac CTA with morphology  03/17/2023  Reason for the exam: Chest pain    Calcium score is 0 Agatston units.       Heart rate 60 bpm.  Left ventricular end-diastolic volume 140 mL.  Left ventricular end-systolic volume 35 mL.  Ejection fraction 75%.  Stroke volume 105 mL.  Cardiac output 6300 mL/m    Study quality is good    The right atrium is normal in size.  The right ventricle is normal in size.  There is grossly normal right ventricular systolic function.  The pulmonary artery is normal in size.  There are 4 pulmonary veins which enter the left atrium in their expected location.  The left atrial appendage was visualized and is without thrombus.  The intra-atrial septum appeared to be intact.  The left ventricle is normal in size with normal systolic function.  There was no evidence of a left ventricular thrombus.  The intraventricular septum appeared to be intact.  The mitral valve appeared structurally normal.  The aortic valve was trileaflet and appears structurally and functionally normal.  The pulmonic valve appeared structurally normal.  The tricuspid valve appeared structurally normal.  There was no pericardial effusion.  The pericardium appeared normal.    The left main coronary artery came off the left coronary cusp in its anticipated location.  It bifurcated into the left anterior descending artery and the circumflex coronary artery.  There was no evidence of atherosclerotic disease of the left main coronary artery.  Left anterior descending artery wraps around the apex of the heart and gives rise to 3 small diagonal branches.  There is no evidence of atherosclerotic disease.  Just after the second small diagonal, there is a short type II bridging segment of the mid LAD. There does not appear to be atherosclerotic disease of the bridged segment.  The circumflex artery is a large-caliber, dominant vessel that gives rise to PDA and PL system with no evidence of atherosclerotic disease. The right  coronary artery is a small, nondominant vessel with no evidence of atherosclerotic disease.    Conclusions:  1.  Normal coronary artery anatomy without evidence of atherosclerotic disease.  Calcium score is 0 Agatston units.  2.  There is a short, type II bridging segment of the mid LAD, just after the second small diagonal takeoff, otherwise structurally normal heart.    Mani Salas MD  03/17/23

## 2023-03-20 ENCOUNTER — OFFICE VISIT (OUTPATIENT)
Dept: CARDIOLOGY | Facility: CLINIC | Age: 43
End: 2023-03-20
Payer: COMMERCIAL

## 2023-03-20 ENCOUNTER — TELEPHONE (OUTPATIENT)
Dept: CARDIOLOGY | Facility: CLINIC | Age: 43
End: 2023-03-20

## 2023-03-20 ENCOUNTER — TELEPHONE (OUTPATIENT)
Dept: SLEEP MEDICINE | Facility: HOSPITAL | Age: 43
End: 2023-03-20
Payer: COMMERCIAL

## 2023-03-20 VITALS
BODY MASS INDEX: 39.68 KG/M2 | HEART RATE: 92 BPM | HEIGHT: 72 IN | SYSTOLIC BLOOD PRESSURE: 160 MMHG | DIASTOLIC BLOOD PRESSURE: 100 MMHG | OXYGEN SATURATION: 98 % | WEIGHT: 293 LBS

## 2023-03-20 DIAGNOSIS — G47.33 OSA (OBSTRUCTIVE SLEEP APNEA): ICD-10-CM

## 2023-03-20 DIAGNOSIS — Q24.5 CORONARY-MYOCARDIAL BRIDGE: ICD-10-CM

## 2023-03-20 DIAGNOSIS — I10 ESSENTIAL HYPERTENSION: Primary | ICD-10-CM

## 2023-03-20 PROCEDURE — 99214 OFFICE O/P EST MOD 30 MIN: CPT | Performed by: NURSE PRACTITIONER

## 2023-03-20 RX ORDER — AMLODIPINE BESYLATE 2.5 MG/1
2.5 TABLET ORAL DAILY
Qty: 30 TABLET | Refills: 11 | Status: SHIPPED | OUTPATIENT
Start: 2023-03-20

## 2023-03-20 RX ORDER — SPIRONOLACTONE 50 MG/1
50 TABLET, FILM COATED ORAL DAILY
Qty: 90 TABLET | Refills: 3 | Status: SHIPPED | OUTPATIENT
Start: 2023-03-20

## 2023-03-20 NOTE — TELEPHONE ENCOUNTER
Patient called stating the pressure was to high on his CPAP and he doesn't feel like he can breath past the pressure of 8. He requested his pressures be lowered so he can tolerate using CPAP

## 2023-03-20 NOTE — PROGRESS NOTES
Date of Office Visit: 23  Encounter Provider: BELKIS Stuart  Place of Service: [facility] CARDIOLOGY  Patient Name: Mani Edgar  :1980    Chief Complaint   Patient presents with   • Follow-up   • Chest Pain   • Essential hypertension   • Sleep Apnea   :     HPI: Mani Edgar is a 42 y.o. male  with hypertension, obstructive sleep apnea, myocardial bridge and obesity.        He is followed by Dr. Leonardo Garcia. I will visit with him  In follow up and have reviewed his medical record.      He had a clinically positive treadmill stress test 2022 with horizontal ST segment depression of 2 mm involving the inferior leads and hypertensive response.  His blood pressure was treated aggressively and he was started on carvedilol, lisinopril and spironolactone.  He had normal bilateral renal artery duplex 2023. He continued to have elevated BP readings so spironolactone was increased to 50 mg mid 2023. Follow up renal function remained stable.     CTA coronary completed 3/17/2023    Conclusions:  1.  Normal coronary artery anatomy without evidence of atherosclerotic disease.  Calcium score is 0 Agatston units.  2.  There is a short, type II bridging segment of the mid LAD, just after the second small diagonal takeoff, otherwise structurally normal heart.    He presents to clinic today for reassessment.  His blood pressure has improved and he is now seeing values 120-130/70-80.  He still has several diastolic values 85-90 and also blood pressure spikes such as 165/106.  He has been having issues with intermittent high pressures on his CPAP.  He has a call out to his sleep medicine physician to discuss.  His chest tightness is overall better but still occurring.  He would like to know if he can start exercising again.  He has occasional shortness of breath.  He still remains off work at [employer]    No Known Allergies        Family and social history  "reviewed.     ROS  All other systems were reviewed and are negative          Objective:     Vitals:    03/20/23 1144   BP: 160/100   BP Location: Left arm   Patient Position: Sitting   Pulse: 92   SpO2: 98%   Weight: 133 kg (293 lb)   Height: 182.9 cm (72\")     Body mass index is 39.74 kg/m².    PHYSICAL EXAM:  Cardiovascular:      Normal rate. Regular rhythm.         Procedures      Current Outpatient Medications   Medication Sig Dispense Refill   • carvedilol (COREG) 25 MG tablet Take 1 tablet by mouth 2 (Two) Times a Day. 180 tablet 3   • lisinopril (PRINIVIL,ZESTRIL) 40 MG tablet Take 1 tablet by mouth Daily. 30 tablet 11   • spironolactone (ALDACTONE) 50 MG tablet Take 1 tablet by mouth Daily. 90 tablet 3   • amLODIPine (NORVASC) 2.5 MG tablet Take 1 tablet by mouth Daily. 30 tablet 11     No current facility-administered medications for this visit.     Assessment:       Diagnosis Plan   1. Essential hypertension        2. DOMONIQUE (obstructive sleep apnea)        3. Coronary-myocardial bridge             No orders of the defined types were placed in this encounter.        Plan:       1.  42-year-old gentleman with hypertension.  Renal artery duplex was normal bilateral June 2023.  His blood pressure remains above goal despite carvedilol 25 mg twice daily, lisinopril 40 mg daily and spironolactone 50 mg daily.  I will add amlodipine 2,5 mg daily. He will keep a bp log and see me again in 4-6 weeks to follow up. I agreed to keep him off work until we follow up. I encouraged that he start to exercise more between now and our next visit given CTA coronary score of zero.     2.  Chest tightness. Improved with better bp control..  CT angiogram coronary  3/17/2023 showed calcium score zero and short, type II bridging segment of the mid LAD, just after the second small diagonal takeoff, otherwise structurally normal heart  3.  Dyspnea-this is intermittent. Lung evaluation from CTA coronary not yet available to review but " symptoms are stable   4.  Obstructive sleep apnea.  Now on CPAP. He is having issues with pap pressures felt to be too high at time. He is waiting to have his concerns addressed by sleep medicine .   5.  Obesity.  BMI greater than 39.0  He would benefit from diet modification and weight loss in addition to his exercise habits which we discussed again today.   He will start to exercise more.       See me in 4-6 weeks       It has been a pleasure to participate in this patient's care.      Thank you,  BELKIS Stuart      **I used Dragon to dictate this note:**

## 2023-03-21 ENCOUNTER — TELEPHONE (OUTPATIENT)
Dept: CARDIOLOGY | Facility: CLINIC | Age: 43
End: 2023-03-21
Payer: COMMERCIAL

## 2023-03-21 NOTE — TELEPHONE ENCOUNTER
Faxed completed disability forms to [facility]/[employer].  Fax# [phone].  Faxed confirmation received./ HERVE

## 2023-03-23 NOTE — PROGRESS NOTES
Mani PRESLEY Herve   [id number]  1980      Please change the CPAP from auto CPAP to CPAP of 8 cm    Otis Galindo MD  3/23/2023

## 2023-03-27 ENCOUNTER — TELEPHONE (OUTPATIENT)
Dept: SLEEP MEDICINE | Facility: HOSPITAL | Age: 43
End: 2023-03-27
Payer: COMMERCIAL

## 2023-05-02 ENCOUNTER — TELEPHONE (OUTPATIENT)
Dept: SLEEP MEDICINE | Facility: HOSPITAL | Age: 43
End: 2023-05-02
Payer: COMMERCIAL

## 2023-05-03 ENCOUNTER — TELEPHONE (OUTPATIENT)
Dept: CARDIOLOGY | Facility: CLINIC | Age: 43
End: 2023-05-03

## 2023-05-03 ENCOUNTER — OFFICE VISIT (OUTPATIENT)
Dept: CARDIOLOGY | Facility: CLINIC | Age: 43
End: 2023-05-03
Payer: COMMERCIAL

## 2023-05-03 VITALS
DIASTOLIC BLOOD PRESSURE: 96 MMHG | OXYGEN SATURATION: 99 % | HEIGHT: 72 IN | WEIGHT: 291 LBS | HEART RATE: 91 BPM | BODY MASS INDEX: 39.42 KG/M2 | SYSTOLIC BLOOD PRESSURE: 160 MMHG

## 2023-05-03 DIAGNOSIS — Z51.81 ENCOUNTER FOR THERAPEUTIC DRUG LEVEL MONITORING: ICD-10-CM

## 2023-05-03 DIAGNOSIS — I10 ESSENTIAL HYPERTENSION: Primary | ICD-10-CM

## 2023-05-03 RX ORDER — LOSARTAN POTASSIUM 50 MG/1
50 TABLET ORAL DAILY
Qty: 90 TABLET | Refills: 2 | Status: SHIPPED | OUTPATIENT
Start: 2023-05-03

## 2023-05-03 RX ORDER — AMLODIPINE BESYLATE 5 MG/1
5 TABLET ORAL DAILY
Qty: 90 TABLET | Refills: 1 | Status: SHIPPED | OUTPATIENT
Start: 2023-05-03

## 2023-05-03 NOTE — TELEPHONE ENCOUNTER
C[facility]na[facility]

## 2023-05-03 NOTE — PROGRESS NOTES
Date of Office Visit: 23  Encounter Provider: BELKIS Stuart  Place of Service: [facility] CARDIOLOGY  Patient Name: Mani Edgar  :1980    Chief Complaint   Patient presents with   • Sleep Apnea   • Hypertension   • Follow-up   :     HPI: Mani Edgar is a 42 y.o. male  with hypertension, obstructive sleep apnea, myocardial bridge and obesity.        He is followed by Dr. Leonardo Garcia. I will visit with him  In follow up and have reviewed his medical record.      He had a clinically positive treadmill stress test 2022 with horizontal ST segment depression of 2 mm involving the inferior leads and hypertensive response.  His blood pressure was treated aggressively and he was started on carvedilol, lisinopril and spironolactone.  He had normal bilateral renal artery duplex 2023. He continued to have elevated BP readings so spironolactone was increased to 50 mg mid 2023. Follow up renal function remained stable.      CTA coronary completed 3/17/2023     Conclusions:  1.  Normal coronary artery anatomy without evidence of atherosclerotic disease.  Calcium score is 0 Agatston units.  2.  There is a short, type II bridging segment of the mid LAD, just after the second small diagonal takeoff, otherwise structurally normal heart.    He continued to have elevated blood pressure so amlodipine was added to his regimen at 2.5 mg.  He presents today for approximately 6-week follow-up.  His list of blood pressure readings show values still too high.  Most of his values are above 140/90.  He has chest tightness on occasion.  He has been increasing his physical activity and jogging 3 to 4 days out of the week.  He also does some weightlifting.  He has a little chest tightness if jogging outside so he slows down but overall is able to push through and complete his exercises.  He still off work.  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  He does a lot of lifting at  "work and is afraid he would not be able to function at work with his chest tightness and blood pressure still not being controlled.          No Known Allergies        Family and social history reviewed.     Review of Systems   Respiratory: Positive for cough.      All other systems were reviewed and are negative          Objective:     Vitals:    05/03/23 1137   BP: 160/96   BP Location: Left arm   Patient Position: Sitting   Pulse: 91   SpO2: 99%   Weight: 132 kg (291 lb)   Height: 182.9 cm (72\")     Body mass index is 39.47 kg/m².    PHYSICAL EXAM:  Pulmonary:      Effort: Pulmonary effort is normal.      Breath sounds: Normal breath sounds.   Cardiovascular:      Normal rate. Regular rhythm.         Procedures      Current Outpatient Medications   Medication Sig Dispense Refill   • carvedilol (COREG) 25 MG tablet Take 1 tablet by mouth 2 (Two) Times a Day. 180 tablet 3   • spironolactone (ALDACTONE) 50 MG tablet Take 1 tablet by mouth Daily. 90 tablet 3   • amLODIPine (NORVASC) 5 MG tablet Take 1 tablet by mouth Daily. 90 tablet 1   • losartan (Cozaar) 50 MG tablet Take 1 tablet by mouth Daily. 90 tablet 2     No current facility-administered medications for this visit.     Assessment:       Diagnosis Plan   1. Essential hypertension        2. Encounter for therapeutic drug level monitoring             No orders of the defined types were placed in this encounter.        Plan:           1.  42-year-old gentleman with hypertension.  Renal artery duplex was normal bilateral June 2023.  His blood pressure remains above goal despite carvedilol 25 mg twice daily, lisinopril 40 mg daily, spironolactone 50 mg daily and amlodipine 2,5 mg daily.   -He is concerned his recent cough is related to lisinopril.  He has been taking Zyrtec occasionally but only if he sneezes.  I will stop lisinopril due to concern for ACE inhibitor cough.  I will switch him to losartan 50 mg daily.  His other agents will remain the same and he " will follow-up with me in approximately 6 weeks.  He will bring me another list of his blood pressure readings until we have his blood pressure little more stable and he has less chest tightness, we will continue to keep him off work at this time.  -I will also increase amlodipine from 2.5 mg daily to 5 mg daily.  2.  Chest tightness. Improved some with better bp control..  CT angiogram coronary  3/17/2023 showed calcium score zero and short, type II bridging segment of the mid LAD, just after the second small diagonal takeoff, otherwise structurally normal heart  - plan as above  3.  Dyspnea-CT chest 03/2023 unremarkable   4.  Obstructive sleep apnea.  Now on CPAP and following with sleep medicine .   5.  Obesity.  BMI greater than 39.0 at 39.47.   He would benefit from diet modification and weight loss in addition to his exercise habits which we discussed again today.   He will continue with routine exercise        Follow up on 6/14/23 as scheduled with me.Call with questions or concerns.               It has been a pleasure to participate in this patient's care.      Thank you,  BELKIS Stuart      **I used Dragon to dictate this note:**

## 2023-05-04 NOTE — TELEPHONE ENCOUNTER
Completed forms have been faxed to [facility] [phone].  Faxed confirmation received./ HERVE

## 2023-06-14 ENCOUNTER — OFFICE VISIT (OUTPATIENT)
Dept: CARDIOLOGY | Facility: CLINIC | Age: 43
End: 2023-06-14
Payer: COMMERCIAL

## 2023-06-14 ENCOUNTER — TELEPHONE (OUTPATIENT)
Dept: CARDIOLOGY | Facility: CLINIC | Age: 43
End: 2023-06-14

## 2023-06-14 VITALS
HEIGHT: 72 IN | OXYGEN SATURATION: 99 % | SYSTOLIC BLOOD PRESSURE: 144 MMHG | BODY MASS INDEX: 39.14 KG/M2 | DIASTOLIC BLOOD PRESSURE: 90 MMHG | WEIGHT: 289 LBS | HEART RATE: 88 BPM

## 2023-06-14 DIAGNOSIS — G47.33 OSA (OBSTRUCTIVE SLEEP APNEA): ICD-10-CM

## 2023-06-14 DIAGNOSIS — I10 ESSENTIAL HYPERTENSION: Primary | ICD-10-CM

## 2023-06-14 DIAGNOSIS — Q24.5 CORONARY-MYOCARDIAL BRIDGE: ICD-10-CM

## 2023-06-14 PROCEDURE — 99214 OFFICE O/P EST MOD 30 MIN: CPT | Performed by: NURSE PRACTITIONER

## 2023-06-14 RX ORDER — LOSARTAN POTASSIUM AND HYDROCHLOROTHIAZIDE 25; 100 MG/1; MG/1
1 TABLET ORAL DAILY
Qty: 30 TABLET | Refills: 2 | Status: SHIPPED | OUTPATIENT
Start: 2023-06-14

## 2023-06-14 NOTE — TELEPHONE ENCOUNTER
Chetna- please [facility] that [facility] have completed today.

## 2023-06-14 NOTE — PROGRESS NOTES
Date of Office Visit: 23  Encounter Provider: BELKIS Stuart  Place of Service: [facility] CARD[facility]  Patient Name: Mani Edgar  :1980    Chief Complaint   Patient presents with    Follow-up   :     HPI: Mani Edgar is a 42 y.o. male  with hypertension, obstructive sleep apnea, myocardial bridge and obesity.        He is followed by Dr. Leonardo Garcia. I will visit with him  In follow up and have reviewed his medical record.      He had a clinically positive treadmill stress test 2022 with horizontal ST segment depression of 2 mm involving the inferior leads and hypertensive response.  His blood pressure was treated aggressively and he was started on carvedilol, lisinopril and spironolactone.  He had normal bilateral renal artery duplex 2023. He continued to have elevated BP readings so spironolactone was increased to 50 mg mid 2023. Follow up renal function remained stable.      CTA coronary completed 3/17/2023     Conclusions:  1.  Normal coronary artery anatomy without evidence of atherosclerotic disease.  Calcium score is 0 Agatston units.  2.  There is a short, type II bridging segment of the mid LAD, just after the second small diagonal takeoff, otherwise structurally normal heart.     He continued to have elevated blood pressure so amlodipine was added to his regimen at 2.5 mg which was increased to 5mg.  Then he had cough with lisinopril so lisinopril was stopped.  He was started on losartan 50 mg daily and now presents for 6-week follow-up.  His blood pressure remains uncontrolled.  His blood pressure log from home shows values 137-160/.  He continues to have intermittent chest pain with exertion but that seems to be getting a little better.  He is wearing CPAP now and tolerating overall.  He is having dizziness.  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         Allergies   Allergen Reactions    Lisinopril Cough           Family and social history reviewed.     ROS  All other systems were reviewed and are negative          Objective:     Vitals:    06/14/23 1457   BP: 144/90   BP Location: Left arm   Patient Position: Sitting   Cuff Size: Adult   Pulse: 88   SpO2: 99%   Weight: 131 kg (289 lb)   Height: 182.9 cm (72\")     Body mass index is 39.2 kg/m².    PHYSICAL EXAM:  Pulmonary:      Effort: Pulmonary effort is normal.      Breath sounds: Normal breath sounds.   Cardiovascular:      Normal rate. Regular rhythm.       Procedures      Current Outpatient Medications   Medication Sig Dispense Refill    amLODIPine (NORVASC) 5 MG tablet Take 1 tablet by mouth Daily. 90 tablet 1    carvedilol (COREG) 25 MG tablet Take 1 tablet by mouth 2 (Two) Times a Day. 180 tablet 3    spironolactone (ALDACTONE) 50 MG tablet Take 1 tablet by mouth Daily. 90 tablet 3    losartan-hydrochlorothiazide (Hyzaar) 100-25 MG per tablet Take 1 tablet by mouth Daily. 30 tablet 2     No current facility-administered medications for this visit.     Assessment:       Diagnosis Plan   1. Essential hypertension        2. DOMONIQUE (obstructive sleep apnea)        3. Coronary-myocardial bridge             No orders of the defined types were placed in this encounter.        Plan:         1.  42-year-old gentleman with resistant hypertension.  Renal artery duplex was normal bilateral June 2023.  His blood pressure remains above goal despite carvedilol 25 mg twice daily, losartan 50 mg daily, spironolactone 50 mg daily and amlodipine 5 mg daily.   -He had ACE inhibitor cough.  I will switch him to losartan 50 mg daily.  His other agents will remain the same and he will follow-up with me in approximately 6 weeks.  He will bring me another list of his blood pressure readings until we have his blood pressure little more stable and he has less chest tightness, we will continue to keep him off work at this " time.  -I will change losartan to losartan/ HCTZ 100-25 and have him track bp at home and return in 6 weeks.   2.  Chest tightness. Improving overall.  CT angiogram coronary  3/17/2023 showed calcium score zero and short, type II bridging segment of the mid LAD, just after the second small diagonal takeoff, otherwise structurally normal heart  - plan as above  3.  Dyspnea-CT chest 03/2023 unremarkable   4.  Obstructive sleep apnea.  Now on CPAP and following with sleep medicine .   5.  Obesity.  BMI greater than 39.0 at 39.20.   He would benefit from diet modification and weight loss in addition to his exercise habits which we discussed again today.   He will continue with routine exercise         Follow up on 7/24/23 as scheduled with me.Call with questions or concerns.          It has been a pleasure to participate in this patient's care.      Thank you,  BELKIS Stuart      **I used Dragon to dictate this note:**

## 2023-06-15 NOTE — TELEPHONE ENCOUNTER
Faxed completed forms to [facility].  Fax# [phone].  Faxed confirmation received./ HERVE

## 2023-07-24 ENCOUNTER — OFFICE VISIT (OUTPATIENT)
Dept: CARDIOLOGY | Facility: CLINIC | Age: 43
End: 2023-07-24
Payer: COMMERCIAL

## 2023-07-24 VITALS
WEIGHT: 287 LBS | BODY MASS INDEX: 38.87 KG/M2 | OXYGEN SATURATION: 97 % | HEART RATE: 80 BPM | SYSTOLIC BLOOD PRESSURE: 156 MMHG | DIASTOLIC BLOOD PRESSURE: 100 MMHG | HEIGHT: 72 IN

## 2023-07-24 DIAGNOSIS — I10 ESSENTIAL HYPERTENSION: Primary | ICD-10-CM

## 2023-07-24 DIAGNOSIS — Q24.5 CORONARY-MYOCARDIAL BRIDGE: ICD-10-CM

## 2023-07-24 DIAGNOSIS — G47.33 OSA (OBSTRUCTIVE SLEEP APNEA): ICD-10-CM

## 2023-07-24 PROCEDURE — 99214 OFFICE O/P EST MOD 30 MIN: CPT | Performed by: NURSE PRACTITIONER

## 2023-07-24 RX ORDER — DILTIAZEM HYDROCHLORIDE 240 MG/1
240 CAPSULE, COATED, EXTENDED RELEASE ORAL DAILY
Qty: 30 CAPSULE | Refills: 3 | Status: SHIPPED | OUTPATIENT
Start: 2023-07-24

## 2023-07-24 NOTE — PROGRESS NOTES
Date of Office Visit: 23  Encounter Provider: BELKIS Stuart  Place of Service: [facility] CARDIOLOGY  Patient Name: Mani Edgar  :1980    Chief Complaint   Patient presents with    Essential hypertension    Sleep Apnea    Chest pain, atypical     With exertion    Follow-up   :     HPI: Mani Edgar is a 42 y.o. male  with hypertension, obstructive sleep apnea, myocardial bridge and obesity.        He is followed by Dr. Leonardo Garcia. I will visit with him  In follow up and have reviewed his medical record.      He had a clinically positive treadmill stress test 2022 with horizontal ST segment depression of 2 mm involving the inferior leads and hypertensive response.  His blood pressure was treated aggressively and he was started on carvedilol, lisinopril and spironolactone.  He had normal bilateral renal artery duplex 2023. He continued to have elevated BP readings so spironolactone was increased to 50 mg mid 2023. Follow up renal function remained stable.      CTA coronary completed 3/17/2023     Conclusions:  1.  Normal coronary artery anatomy without evidence of atherosclerotic disease.  Calcium score is 0 Agatston units.  2.  There is a short, type II bridging segment of the mid LAD, just after the second small diagonal takeoff, otherwise structurally normal heart.     He continued to have elevated blood pressure so amlodipine was added to his regimen at 2.5 mg which was increased to 5mg.  Then he had cough with lisinopril so lisinopril was stopped.  He was started on losartan 50 mg daily however blood pressure did not significantly improved.  He then was switched to losartan/hydrochlorothiazide 100-25 on 2023.      He presents today for reassessment.  Unfortunately his blood pressure has not improved.  He brought me a list of home blood pressure readings over the last 2 weeks showing values 147-171/.  He is now using PAP  "therapy most nights.  He continues to have chest pain if he does a lot of exercise.  He is able to jog a quarter of a mile and then he walks.  He does this alternating between walking and jogging for 30 minutes.  He still needs follow-up with sleep medicine since his last device adjustments.  He is limiting sodium in his diet.  He has no near-syncope or syncope.  He remains off work at this time.  We have not checked his home blood pressure machine in the office      Allergies   Allergen Reactions    Lisinopril Cough           Family and social history reviewed.     ROS  All other systems were reviewed and are negative          Objective:     Vitals:    07/24/23 1531   BP: 156/100   BP Location: Left arm   Patient Position: Sitting   Pulse: 80   SpO2: 97%   Weight: 130 kg (287 lb)   Height: 182.9 cm (72\")     Body mass index is 38.92 kg/m².    PHYSICAL EXAM:  Pulmonary:      Effort: Pulmonary effort is normal.   Cardiovascular:      Normal rate. Regular rhythm.       Procedures      Current Outpatient Medications   Medication Sig Dispense Refill    carvedilol (COREG) 25 MG tablet Take 1 tablet by mouth 2 (Two) Times a Day. 180 tablet 3    losartan-hydrochlorothiazide (Hyzaar) 100-25 MG per tablet Take 1 tablet by mouth Daily. 30 tablet 2    spironolactone (ALDACTONE) 50 MG tablet Take 1 tablet by mouth Daily. 90 tablet 3    dilTIAZem CD (CARDIZEM CD) 240 MG 24 hr capsule Take 1 capsule by mouth Daily. 30 capsule 3     No current facility-administered medications for this visit.     Assessment:       Diagnosis Plan   1. Essential hypertension        2. Coronary-myocardial bridge        3. DOMONIQUE (obstructive sleep apnea)             No orders of the defined types were placed in this encounter.        Plan:       1.  42-year-old gentleman with resistant hypertension.  Renal artery duplex was normal bilateral June 2023.  His blood pressure remains above goal despite carvedilol 25 mg twice daily, " losartan/hydrochlorothiazide 100-25 mg daily, spironolactone 50 mg daily and amlodipine 5 mg daily.   -He had ACE inhibitor cough.  I will switch him from amlodipine 5 mg to diltiazem 240 mg daily.  I have asked him to bring his blood pressure machine to next clinic visit on 8/14/2023 so we can check his home cuff and ensure this is accurate.  Encourage continue exercise and low-sodium diet.  2.  Chest tightness. Improving overall.  CT angiogram coronary  3/17/2023 showed calcium score zero and short, type II bridging segment of the mid LAD, just after the second small diagonal takeoff, otherwise structurally normal heart  - plan as above  3.  Dyspnea-CT chest 03/2023 unremarkable   4.  Obstructive sleep apnea.  Now on CPAP and following with sleep medicine .  I have sent a message to sleep medicine to contact patient to schedule follow-up.5.  Obesity.  BMI 38.92  He will continue with routine exercise    Call questions or concerns            It has been a pleasure to participate in this patient's care.      Thank you,  BELKIS Stuart      **I used Dragon to dictate this note:**

## 2023-07-25 ENCOUNTER — TELEPHONE (OUTPATIENT)
Dept: CARDIOLOGY | Facility: CLINIC | Age: 43
End: 2023-07-25
Payer: COMMERCIAL

## 2023-07-25 NOTE — TELEPHONE ENCOUNTER
Faxed completed [employer] forms (completed by AL on 7/25/23) along with copy of Carlotta Giraldo's LOV note to fax# [phone].  Faxed confirmation received./ HERVE

## 2023-08-14 ENCOUNTER — TELEPHONE (OUTPATIENT)
Dept: CARDIOLOGY | Facility: CLINIC | Age: 43
End: 2023-08-14

## 2023-08-14 ENCOUNTER — OFFICE VISIT (OUTPATIENT)
Dept: CARDIOLOGY | Facility: CLINIC | Age: 43
End: 2023-08-14
Payer: COMMERCIAL

## 2023-08-14 VITALS
WEIGHT: 288.4 LBS | BODY MASS INDEX: 39.06 KG/M2 | HEART RATE: 67 BPM | DIASTOLIC BLOOD PRESSURE: 100 MMHG | HEIGHT: 72 IN | SYSTOLIC BLOOD PRESSURE: 140 MMHG

## 2023-08-14 DIAGNOSIS — I10 ESSENTIAL HYPERTENSION: Primary | ICD-10-CM

## 2023-08-14 DIAGNOSIS — Q24.5 CORONARY-MYOCARDIAL BRIDGE: ICD-10-CM

## 2023-08-14 DIAGNOSIS — G47.33 OSA (OBSTRUCTIVE SLEEP APNEA): ICD-10-CM

## 2023-08-14 PROCEDURE — 99214 OFFICE O/P EST MOD 30 MIN: CPT | Performed by: NURSE PRACTITIONER

## 2023-08-14 RX ORDER — VALSARTAN AND HYDROCHLOROTHIAZIDE 320; 25 MG/1; MG/1
1 TABLET, FILM COATED ORAL DAILY
Qty: 30 TABLET | Refills: 11 | Status: SHIPPED | OUTPATIENT
Start: 2023-08-14 | End: 2024-08-13

## 2023-08-14 RX ORDER — SPIRONOLACTONE 50 MG/1
50 TABLET, FILM COATED ORAL DAILY
Qty: 90 TABLET | Refills: 3 | Status: SHIPPED | OUTPATIENT
Start: 2023-08-14

## 2023-08-14 NOTE — TELEPHONE ENCOUNTER
Faxed completed disability forms to [facility]/ [place].  Fax# [phone].  Faxed confirmation received./ HERVE

## 2023-08-14 NOTE — PROGRESS NOTES
Date of Office Visit: 23  Encounter Provider: BELKIS Stuart  Place of Service: [facility] CARDIOLOGY  Patient Name: Mani Edgar  :1980    Chief Complaint   Patient presents with    Essential hypertension    Sleep Apnea    Hypertension   :     HPI: Mani Edgar is a 42 y.o. male  with hypertension, obstructive sleep apnea, myocardial bridge and obesity.        He is followed by Dr. Leonardo Garcia. I will visit with him in follow up and have reviewed his medical record.      He had a clinically positive treadmill stress test 2022 with horizontal ST segment depression of 2 mm involving the inferior leads and hypertensive response.  His blood pressure was treated aggressively and he was started on carvedilol, lisinopril and spironolactone.  He had normal bilateral renal artery duplex 2023. He continued to have elevated BP readings so spironolactone was increased to 50 mg mid 2023. Follow up renal function remained stable.      CTA coronary completed 3/17/2023 showed Normal coronary artery anatomy without evidence of atherosclerotic disease.  Calcium score is 0 Agatston units. There was a short, type II bridging segment of the mid LAD, just after the second small diagonal takeoff, otherwise structurally normal heart.     He continued to have elevated blood pressure so amlodipine was added to his regimen at 2.5 mg which was increased to 5mg.  Then he had cough with lisinopril so lisinopril was stopped.  He was started on losartan 50 mg daily however blood pressure did not significantly improved.  He then was switched to losartan/hydrochlorothiazide 100-25 on 2023.  I then switched him from amlodipine to diltiazem 240 mg daily due to resistant hypertension.        He presents today for reassessment.  He brought his blood pressure cuff in to be checked and his automatic cuff on the first check was more than 50 mmHg elevated on the systolic value  "and then with a second comparison his diastolic value was 17 points more.  He is adamant that he does not add salt to food.  He does not eat out at restaurants often.  He is wearing CPAP as best as possible since his last settings were adjusted.  He had to reschedule his sleep medicine follow-up for next month.  He is able to walk up to 30 minutes but has some chest tightness with that.  He is able to complete his exertional activity.  He has \"a little shortness of breath\" with that but overall his symptoms of chest tightness and shortness of breath are unchanged.            Allergies   Allergen Reactions    Lisinopril Cough           Family and social history reviewed.     ROS  All other systems were reviewed and are negative          Objective:     Vitals:    08/14/23 1453   BP: 140/100   BP Location: Left arm   Patient Position: Sitting   Cuff Size: Adult   Pulse: 67   Weight: 131 kg (288 lb 6.4 oz)   Height: 182.9 cm (72.01\")     Body mass index is 39.11 kg/mý.    PHYSICAL EXAM:  Pulmonary:      Effort: Pulmonary effort is normal.      Breath sounds: Normal breath sounds.   Cardiovascular:      Normal rate.       Procedures      Current Outpatient Medications   Medication Sig Dispense Refill    carvedilol (COREG) 25 MG tablet Take 1 tablet by mouth 2 (Two) Times a Day. 180 tablet 3    dilTIAZem CD (CARDIZEM CD) 240 MG 24 hr capsule Take 1 capsule by mouth Daily. 30 capsule 3    spironolactone (ALDACTONE) 50 MG tablet Take 1 tablet by mouth Daily. 90 tablet 3    valsartan-hydrochlorothiazide (DIOVAN-HCT) 320-25 MG per tablet Take 1 tablet by mouth Daily. 30 tablet 11     No current facility-administered medications for this visit.     Assessment:       Diagnosis Plan   1. Essential hypertension        2. Coronary-myocardial bridge        3. DOMONIQUE (obstructive sleep apnea)             No orders of the defined types were placed in this encounter.        Plan:       1.  42-year-old gentleman with resistant " hypertension.  Renal artery duplex was normal bilateral June 2023.  His blood pressure remains above goal despite carvedilol 25 mg twice daily, losartan/hydrochlorothiazide 100-25 mg daily, spironolactone 50 mg daily and diltiazem 240 mg daily.-He had ACE inhibitor cough.   -I will switch him from losartan/HCTZ to valsartan/HCTZ 360-25 mg.  He will continue blood pressure monitoring at home and bring me another list in 2 weeks when he follows up.  He will also require BMP in 2 weeks when he follows up to reassess renal function  2.  Chest tightness. Improving overall.  CT angiogram coronary  3/17/2023 showed calcium score zero and short, type II bridging segment of the mid LAD, just after the second small diagonal takeoff, otherwise structurally normal heart  - plan as above  3.  Dyspnea-CT chest 03/2023 unremarkable   4.  Obstructive sleep apnea.  Now on CPAP and following with sleep medicine .  He has an appointment to follow-up in 1 month  5.  Obesity.  BMI 39.11.  Mediterranean diet, healthy heart diet, intermittent fasting 12 to 14 hours and routine exercise may help to aid weight loss.  6.  Disability-he still does not feel comfortable to return to work due to chest tightness and uncontrolled blood pressure.  He typically needs to bend and lift 40 pounds or more frequently at work and he still having chest tightness with walking 30 minutes on flat pavement.  I have agreed to keep him off for another 2 weeks with the above medication adjustment.    He will continue with routine exercise.                  It has been a pleasure to participate in this patient's care.      Thank you,  BELKIS Stuart      **I used Dragon to dictate this note:**

## 2023-08-29 ENCOUNTER — OFFICE VISIT (OUTPATIENT)
Dept: CARDIOLOGY | Facility: CLINIC | Age: 43
End: 2023-08-29
Payer: COMMERCIAL

## 2023-08-29 VITALS
WEIGHT: 284 LBS | BODY MASS INDEX: 37.64 KG/M2 | SYSTOLIC BLOOD PRESSURE: 160 MMHG | HEART RATE: 82 BPM | HEIGHT: 73 IN | DIASTOLIC BLOOD PRESSURE: 90 MMHG

## 2023-08-29 DIAGNOSIS — I10 RESISTANT HYPERTENSION: ICD-10-CM

## 2023-08-29 DIAGNOSIS — I10 ESSENTIAL HYPERTENSION: ICD-10-CM

## 2023-08-29 DIAGNOSIS — Q24.5 CORONARY-MYOCARDIAL BRIDGE: ICD-10-CM

## 2023-08-29 DIAGNOSIS — R07.89 CHEST PAIN, ATYPICAL: Primary | ICD-10-CM

## 2023-08-29 RX ORDER — LOSARTAN POTASSIUM AND HYDROCHLOROTHIAZIDE 25; 100 MG/1; MG/1
1 TABLET ORAL DAILY
Qty: 30 TABLET | Refills: 5 | Status: SHIPPED | OUTPATIENT
Start: 2023-08-29

## 2023-08-29 RX ORDER — DILTIAZEM HYDROCHLORIDE 240 MG/1
240 CAPSULE, COATED, EXTENDED RELEASE ORAL DAILY
Qty: 30 CAPSULE | Refills: 3 | Status: SHIPPED | OUTPATIENT
Start: 2023-08-29

## 2023-08-29 NOTE — PROGRESS NOTES
Date of Office Visit: 23  Encounter Provider: BELKIS Stuart  Place of Service: [facility] CARDIOLOGY  Patient Name: Mani Edgar  :1980    Chief Complaint   Patient presents with    Hypertension   :     HPI: Mani Edgar is a 42 y.o. male  with hypertension, obstructive sleep apnea, myocardial bridge and obesity.        He is followed by Dr. Leonardo Garcia. I will visit with him in follow up and have reviewed his medical record.      He had a clinically positive treadmill stress test 2022 with horizontal ST segment depression of 2 mm involving the inferior leads and hypertensive response.  His blood pressure was treated aggressively and he was started on carvedilol, lisinopril and spironolactone.  He had normal bilateral renal artery duplex 2023. He continued to have elevated BP readings so spironolactone was increased to 50 mg mid 2023. Follow up renal function remained stable.      CTA coronary completed 3/17/2023 showed Normal coronary artery anatomy without evidence of atherosclerotic disease.  Calcium score is 0 Agatston units. There was a short, type II bridging segment of the mid LAD, just after the second small diagonal takeoff, otherwise structurally normal heart.     He continued to have elevated blood pressure so amlodipine was added to his regimen at 2.5 mg which was increased to 5mg.  Then he had cough with lisinopril so lisinopril was stopped.  He was started on losartan 50 mg daily however blood pressure did not significantly improved.  He then was switched to losartan/hydrochlorothiazide 100-25 on 2023.  I then switched him from amlodipine to diltiazem 240 mg daily due to resistant hypertension.  He presents today for reassessment.  I tried him on valsartan/hydrochlorothiazide 320-25 but he reports increased insomnia and headache with that so he would like to be switched back to losartan.  He is staying active and  "exercising up to 30 minutes he has intermittent chest pain but nothing new or worse.  He is wearing CPAP and is scheduled to follow-up with sleep medicine next month.  His weight is down a little bit since I saw him last.  He has no near-syncope or syncope.  He tells me that he will need to return to work soon given a contract change at work which will not allow him to be off much longer.            Allergies   Allergen Reactions    Lisinopril Cough           Family and social history reviewed.     ROS  All other systems were reviewed and are negative          Objective:     Vitals:    08/29/23 1507   BP: 160/90   Pulse: 82   Weight: 129 kg (284 lb)   Height: 185.4 cm (73\")     Body mass index is 37.47 kg/mý.    PHYSICAL EXAM:  Pulmonary:      Effort: Pulmonary effort is normal.   Cardiovascular:      Normal rate.         ECG 12 Lead    Date/Time: 8/29/2023 4:45 PM  Performed by: Carlotta Giraldo APRN  Authorized by: Carlotta Giraldo APRN   Comparison: compared with previous ECG   Similar to previous ECG  Rhythm: sinus rhythm  Rate: normal  QRS axis: normal  Comments: Artifact however no significant ischemic change noted.          Current Outpatient Medications   Medication Sig Dispense Refill    carvedilol (COREG) 25 MG tablet Take 1 tablet by mouth 2 (Two) Times a Day. 180 tablet 3    dilTIAZem CD (CARDIZEM CD) 240 MG 24 hr capsule Take 1 capsule by mouth Daily. 30 capsule 3    spironolactone (ALDACTONE) 50 MG tablet Take 1 tablet by mouth Daily. 90 tablet 3    losartan-hydrochlorothiazide (Hyzaar) 100-25 MG per tablet Take 1 tablet by mouth Daily. 30 tablet 5     No current facility-administered medications for this visit.     Assessment:       Diagnosis Plan   1. Chest pain, atypical        2. Essential hypertension  Comprehensive Metabolic Panel      3. Coronary-myocardial bridge  ECG 12 Lead      4. Resistant hypertension  CT abdomen pelvis wo contrast    Metanephrines, Urine, 24 Hour - Urine, Clean Catch    " Cortisol, Free    Cortisol, Urine, Free 24Hr - Urine, Clean Catch    Aldosterone, Urine, 24 Hour - Urine, Clean Catch    Aldosterone    Metanephrines, Frac. Free, Plasma    Catecholamine+VMA, 24-Hr Urine - Urine, Clean Catch           Orders Placed This Encounter   Procedures    CT abdomen pelvis wo contrast     Standing Status:   Future     Standing Expiration Date:   8/29/2024     Order Specific Question:   Will Oral Contrast be needed for this procedure?     Answer:   No     Order Specific Question:   Release to patient     Answer:   Routine Release [[id number]]    Metanephrines, Urine, 24 Hour - Urine, Clean Catch     Standing Status:   Future     Standing Expiration Date:   8/28/2024     Order Specific Question:   Release to patient     Answer:   Routine Release [[id number]]    Cortisol, Free     Standing Status:   Future     Standing Expiration Date:   8/29/2024     Order Specific Question:   Release to patient     Answer:   Routine Release [[id number]]    Cortisol, Urine, Free 24Hr - Urine, Clean Catch     Standing Status:   Future     Standing Expiration Date:   8/29/2024     Order Specific Question:   Release to patient     Answer:   Routine Release [[id number]]    Aldosterone, Urine, 24 Hour - Urine, Clean Catch     Standing Status:   Future     Standing Expiration Date:   8/29/2024     Order Specific Question:   Release to patient     Answer:   Routine Release [[id number]]    Aldosterone     Standing Status:   Future     Standing Expiration Date:   8/29/2024     Order Specific Question:   Release to patient     Answer:   Routine Release [[id number]]    Metanephrines, Frac. Free, Plasma     Standing Status:   Future     Standing Expiration Date:   8/29/2024     Order Specific Question:   Release to patient     Answer:   Routine Release [[id number]]    Catecholamine+VMA, 24-Hr Urine - Urine, Clean Catch     Standing Status:   Future     Standing Expiration Date:   8/29/2024     Order Specific Question:    Release to patient     Answer:   Routine Release [[id number]]    Comprehensive Metabolic Panel     Standing Status:   Future     Standing Expiration Date:   8/29/2024     Order Specific Question:   Release to patient     Answer:   Routine Release [[id number]]    ECG 12 Lead     This order was created via procedure documentation     Order Specific Question:   Release to patient     Answer:   Routine Release [[id number]]         Plan:       1.  42-year-old gentleman with resistant hypertension.  Renal artery duplex was normal bilateral June 2023.  His blood pressure remains above goal despite carvedilol 25 mg twice daily, valsartan/hydrochlorothiazide 320-25 mg daily, spironolactone 50 mg daily and diltiazem 240 mg daily.-He had ACE inhibitor cough.   -I will switch him from valsartan/HCTZ 360-25 mg back to  losartan/HCTZ at higher dose of  100-25.  He will continue blood pressure monitoring at home and bring me another list in 2 weeks when he follows up.    -I have placed orders for CMP  and further secondary workup including CT abd/pelvis to rule out pheochromocytoma. Labs too include aldosterone, cortisol level  along with urine catecholamine, metanephrine, VMA, urine cortisol  2.  Chest tightness. Improving overall.  CT angiogram coronary  3/17/2023 showed calcium score zero and short, type II bridging segment of the mid LAD, just after the second small diagonal takeoff, otherwise structurally normal heart  - plan as above  3.  Dyspnea-CT chest 03/2023 unremarkable   4.  Obstructive sleep apnea.  Now on CPAP and following with sleep medicine .  He has an appointment to follow-up next month  5.  Obesity.  BMI 37.47.   6.  Disability-I will keep him off for another 2 weeks but we have him returning to work with the restriction to need frequent bathroom breaks starting 9/12/2023.     Call with questions or concerns.               It has been a pleasure to participate in this patient's care.      Thank you,  Carlotta  BELKIS Giraldo      **I used Dragon to dictate this note:**

## 2023-09-05 ENCOUNTER — TELEPHONE (OUTPATIENT)
Dept: CARDIOLOGY | Facility: CLINIC | Age: 43
End: 2023-09-05

## 2023-09-05 NOTE — TELEPHONE ENCOUNTER
Caller: Mani Edgar    Relationship: Self    Best call back number: [phone]    What is the best time to reach you: ANY    Who are you requesting to speak with (clinical staff, provider,  specific staff member): CLINICAL      What was the call regarding: PAPERWORK WAS FILLED OUT INCORRECTLY AND PATIENTS WORK NEEDS IT FILLED OUT CORRECTLY     Is it okay if the provider responds through MyChart: NO

## 2023-09-05 NOTE — TELEPHONE ENCOUNTER
I called chuck hallman with patient.  He states the form is missing the restrictions.  It only requires frequent bathroom breaks during patient's shift.  The forms will also require Dr. Garcia's signature as well.  I have printed the forms for you to addend if appropriate.  Placing in your inbox.       / HERVE

## 2023-09-06 ENCOUNTER — TELEPHONE (OUTPATIENT)
Dept: CARDIOLOGY | Facility: CLINIC | Age: 43
End: 2023-09-06

## 2023-09-06 NOTE — TELEPHONE ENCOUNTER
Faxed updated disability forms to [facility].  Only restriction is the need for frequent bathroom breaks during patient's shift.  Dr. Leonardo Garcia's signature as been added.  Fax# [phone].  Faxed confirmation received./ HERVE

## 2023-09-06 NOTE — TELEPHONE ENCOUNTER
Caller: Mani Edgar    Relationship to patient: Self    Best call back number: [phone]    Chief complaint: NONE    Type of visit: FOLLOW UP    Requested date: 9.11.23     Additional notes:  PT IS CALLING BECAUSE APPT WAS CANCELLED WITH BELKIS ESPINOSA ON 9.11.23. I TOLD PT SHE HAD CANCELLED THAT APPT AND THAT HE HAS BEEN RESCHEDULED TO 12.20.23 WITH DR. BENÍTEZ. HE SAID NO ONE NOTIFIED HIM ABOUT THAT CHANGE AND THAT HE WAS SUPPOSED TO FOLLOW UP WITH SOMEONE IN 2 WEEKS. PT IS REQUESTING A CALL BACK,

## 2024-08-07 RX ORDER — LOSARTAN POTASSIUM AND HYDROCHLOROTHIAZIDE 25; 100 MG/1; MG/1
1 TABLET ORAL DAILY
Qty: 90 TABLET | Refills: 3 | Status: SHIPPED | OUTPATIENT
Start: 2024-08-07

## 2024-08-07 RX ORDER — CARVEDILOL 25 MG/1
25 TABLET ORAL 2 TIMES DAILY
Qty: 180 TABLET | Refills: 3 | Status: SHIPPED | OUTPATIENT
Start: 2024-08-07

## 2025-03-10 RX ORDER — LOSARTAN POTASSIUM AND HYDROCHLOROTHIAZIDE 25; 100 MG/1; MG/1
1 TABLET ORAL DAILY
Qty: 90 TABLET | Refills: 3 | Status: SHIPPED | OUTPATIENT
Start: 2025-03-10